# Patient Record
Sex: FEMALE | Race: AMERICAN INDIAN OR ALASKA NATIVE
[De-identification: names, ages, dates, MRNs, and addresses within clinical notes are randomized per-mention and may not be internally consistent; named-entity substitution may affect disease eponyms.]

---

## 2019-01-09 ENCOUNTER — HOSPITAL ENCOUNTER (OUTPATIENT)
Dept: HOSPITAL 5 - SPVWC | Age: 44
Discharge: HOME | End: 2019-01-09
Attending: OBSTETRICS & GYNECOLOGY
Payer: COMMERCIAL

## 2019-01-09 DIAGNOSIS — R92.8: Primary | ICD-10-CM

## 2019-01-09 NOTE — MAMMOGRAPHY REPORT
LEFT DIGITAL DIAGNOSTIC MAMMOGRAM : 01/09/19 10:09:00



CLINICAL: Recalled for asymmetries.



COMPARISON:12/17/18 screening



FINDINGS: Additional mammographic views were performed and are negative.



IMPRESSION: Negative Mammogram.



BI-RADS CATEGORY:  1 -- Negative



RECOMMENDATION: Routine mammographic screening in one year.



ACR BI-RADS MAMMOGRAPHIC CODES:

0 = Needs additional imaging evaluation; 1 = Negative; 2 = Benign; 3 = 

Probably benign; 4 = Suspicious; 5 = Malignant; 6 = Known biopsy-proven 

malignancy



COMMENT:

      1.   Dense breast tissue, i.e., adenosis, fibrocystic 

            changes, etc., may obscure an underlying neoplasm.

      2.   Approximately 10% of cancers are not detected with

            mammography.

      3.   A negative mammography report should not delay biopsy 

            if a clinically suspicious mass is present.





COMMENT:

Patient follow-up letters are generated via our [employer] 

application.